# Patient Record
Sex: FEMALE | Race: WHITE
[De-identification: names, ages, dates, MRNs, and addresses within clinical notes are randomized per-mention and may not be internally consistent; named-entity substitution may affect disease eponyms.]

---

## 2017-01-06 ENCOUNTER — RX ONLY (RX ONLY)
Age: 47
End: 2017-01-06

## 2017-01-09 ENCOUNTER — DOCTOR'S OFFICE (OUTPATIENT)
Dept: URBAN - METROPOLITAN AREA CLINIC 136 | Facility: CLINIC | Age: 47
Setting detail: OPHTHALMOLOGY
End: 2017-01-09

## 2017-01-09 DIAGNOSIS — H53.002: ICD-10-CM

## 2017-01-09 DIAGNOSIS — H52.223: ICD-10-CM

## 2017-01-09 DIAGNOSIS — H52.4: ICD-10-CM

## 2017-01-09 DIAGNOSIS — H52.03: ICD-10-CM

## 2017-01-09 PROCEDURE — 92015 DETERMINE REFRACTIVE STATE: CPT | Performed by: OPTOMETRIST

## 2017-01-09 ASSESSMENT — REFRACTION_MANIFEST
OD_ADD: +1.75
OS_ADD: +1.75
OS_AXIS: 180
OS_VA1: 20/25+1
OD_CYLINDER: -1.75
OU_VA: 20/
OD_SPHERE: +1.50
OS_VA3: 20/
OD_VA1: 20/20
OD_VA3: 20/
OS_SPHERE: +5.00
OD_VA2: 20/
OD_VA1: 20/20
OD_VA3: 20/
OS_CYLINDER: -2.50
OS_SPHERE: +3.00
OS_VA2: 20/
OS_CYLINDER: +2.50
OS_VA3: 20/
OD_CYLINDER: +2.25
OS_VA1: 20/25+
OD_AXIS: 90
OS_VA2: 20/25
OU_VA: 20/20
OD_SPHERE: -0.75
OS_AXIS: 90
OD_AXIS: 010
OD_VA2: 20/20

## 2017-01-09 ASSESSMENT — REFRACTION_CURRENTRX
OD_OVR_VA: 20/
OD_OVR_VA: 20/
OS_OVR_VA: 20/
OD_OVR_VA: 20/

## 2017-01-09 ASSESSMENT — AXIALLENGTH_DERIVED
OS_AL: 21.5066
OD_AL: 22.8716
OD_AL: 22.7804
OD_AL: 22.8716
OS_AL: 21.346
OS_AL: 21.4662

## 2017-01-09 ASSESSMENT — SPHEQUIV_DERIVED
OS_SPHEQUIV: 4.25
OS_SPHEQUIV: 3.875
OD_SPHEQUIV: 0.375
OD_SPHEQUIV: 0.625
OD_SPHEQUIV: 0.375
OS_SPHEQUIV: 3.75

## 2017-01-09 ASSESSMENT — REFRACTION_OUTSIDERX
OD_SPHERE: +1.25
OS_VA3: 20/
OD_VA1: 20/
OS_VA1: 20/
OS_AXIS: 180
OD_AXIS: 010
OD_VA2: 20/
OS_SPHERE: +3.50
OS_ADD: +2.00
OS_VA2: 20/
OS_CYLINDER: -1.75
OD_CYLINDER: -1.75
OU_VA: 20/
OD_VA3: 20/
OD_ADD: +2.00

## 2017-01-09 ASSESSMENT — VISUAL ACUITY
OS_BCVA: 20/40-1
OD_BCVA: 20/200

## 2017-01-09 ASSESSMENT — KERATOMETRY
OD_K1POWER_DIOPTERS: 44.25
OS_K1POWER_DIOPTERS: 44.25
OD_K2POWER_DIOPTERS: 46.00
OD_AXISANGLE_DEGREES: 005
OS_AXISANGLE_DEGREES: 008
OS_K2POWER_DIOPTERS: 47.25

## 2017-01-09 ASSESSMENT — REFRACTION_AUTOREFRACTION
OS_SPHERE: +5.25
OS_AXIS: 003
OD_CYLINDER: -2.25
OS_CYLINDER: -2.75
OD_SPHERE: +1.50
OD_AXIS: 006

## 2017-01-09 ASSESSMENT — CONFRONTATIONAL VISUAL FIELD TEST (CVF)
OD_FINDINGS: FULL
OS_FINDINGS: FULL

## 2017-01-13 PROBLEM — H53.002: Status: ACTIVE | Noted: 2017-01-06

## 2017-01-13 PROBLEM — H52.223 ASTIGMATISM, REGULAR; BOTH EYES ;
MILD: Status: ACTIVE | Noted: 2017-01-06

## 2017-01-24 ENCOUNTER — OFFICE VISIT (OUTPATIENT)
Dept: URGENT CARE | Facility: MEDICAL CENTER | Age: 47
End: 2017-01-24
Payer: COMMERCIAL

## 2017-01-24 PROCEDURE — 99203 OFFICE O/P NEW LOW 30 MIN: CPT

## 2018-02-02 RX ORDER — DICLOFENAC SODIUM 75 MG/1
TABLET, DELAYED RELEASE ORAL
Refills: 0 | COMMUNITY
Start: 2017-11-28

## 2018-02-02 RX ORDER — BACLOFEN 10 MG/1
TABLET ORAL
COMMUNITY

## 2018-02-02 RX ORDER — VALACYCLOVIR HYDROCHLORIDE 1 G/1
TABLET, FILM COATED ORAL EVERY 12 HOURS
COMMUNITY
Start: 2017-01-24

## 2018-02-02 RX ORDER — DIAZEPAM 5 MG/1
5 TABLET ORAL DAILY PRN
Refills: 0 | COMMUNITY
Start: 2017-12-11

## 2018-02-05 ENCOUNTER — OFFICE VISIT (OUTPATIENT)
Dept: NEUROLOGY | Facility: CLINIC | Age: 48
End: 2018-02-05
Payer: COMMERCIAL

## 2018-02-05 ENCOUNTER — DOCUMENTATION (OUTPATIENT)
Dept: NEUROLOGY | Facility: CLINIC | Age: 48
End: 2018-02-05

## 2018-02-05 VITALS
WEIGHT: 127.6 LBS | SYSTOLIC BLOOD PRESSURE: 106 MMHG | RESPIRATION RATE: 18 BRPM | HEIGHT: 66 IN | HEART RATE: 77 BPM | BODY MASS INDEX: 20.51 KG/M2 | DIASTOLIC BLOOD PRESSURE: 71 MMHG

## 2018-02-05 DIAGNOSIS — M47.892 OTHER OSTEOARTHRITIS OF SPINE, CERVICAL REGION: Primary | ICD-10-CM

## 2018-02-05 DIAGNOSIS — M48.00 SPINAL STENOSIS, UNSPECIFIED SPINAL REGION: ICD-10-CM

## 2018-02-05 DIAGNOSIS — M79.18 MYOFASCIAL PAIN: ICD-10-CM

## 2018-02-05 PROCEDURE — 99245 OFF/OP CONSLTJ NEW/EST HI 55: CPT | Performed by: PSYCHIATRY & NEUROLOGY

## 2018-02-05 RX ORDER — METHOCARBAMOL 500 MG/1
500 TABLET, FILM COATED ORAL 3 TIMES DAILY
Qty: 90 TABLET | Refills: 3 | Status: SHIPPED | OUTPATIENT
Start: 2018-02-05

## 2018-02-05 RX ORDER — CETIRIZINE HYDROCHLORIDE 10 MG/1
10 TABLET ORAL
COMMUNITY

## 2018-02-05 NOTE — PROGRESS NOTES
I sent the patients disc from [facility] to [address] [place] [address]'s Radiology to be uploaded onto PACS

## 2018-02-05 NOTE — PROGRESS NOTES
Patient is here today as a new patient for a consult for Dystonia and Myofascial pain    Patient ID: Yara Zuñiga is a 49 y o  female  Assessment/Plan:   Problem List Items Addressed This Visit     None      Visit Diagnoses     Other osteoarthritis of spine, cervical region    -  Primary    Myofascial pain        Relevant Medications    methocarbamol (ROBAXIN) 500 mg tablet    Other Relevant Orders    Ambulatory referral to Pain Management    Spinal stenosis, unspecified spinal region           Mrs Thaddeus Barrios has been presented for diffuse muscle spasms likely due to severe cervical spinal stenosis with myelopathy and radiculopathy  Patient described transient muscle spasms in her right upper neck and shoulder, with improvement in her unction with Botox injections provided by [facility] team  Patient described left upper neck myofascial pain, including upper chest muscles  Hr exam was consistent with a full strength and no signs of spasticity with no dystonic features noted  Patient had negative anti ROLAN 65 antibodies and no stiff person syndroem features, as per Dr Estela Lala  Patient will start Robaxin 500 mg slow titration up to tid  She will follow with pain management to adjust her regimen and address upper neck and lower back issues  She is to continue Botox injection for presumable right should er dystonic features  Patient has been on disability for advanced arthritis and myofascial pain syndrome  We agreed to repeat MRI pain, we will upload MRI C/T/L spine to PACS  We discussed that all questions with her disability she must address with her PCP  No new ocal neurologic deficit noted on her exam, no extrapyramidal signs, normal reflexes, no coordination issues  Greater than 50% of the 60 minutes evaluation was a face-to-face discussion regarding  the pathophysiology of her current symptoms and further plan, as well as counseling, educating, and coordinating the patient's care  Billy Figueroa is a 49year old female with a history of cervical spondylosis and severe cord compression, lumbar spondylosis, myofascial pain syndrome, muscle spasms, who presented to [address] [place] [address] for evaluation of diffuse muscle spasm  As per the patient, she had upper neck muscle spasm in 2013, and in 2014 she had MRI spine with severe cervical spine stenosis at C4-5 and C5-6, requiring surgical intervention  Patient had surgical intervention in [facility] by Dr Oscar Cunningham  Patient described multiple new symptoms including right upper extremity forced shoulder abduction, with sensation of pulling muscles likely do to muscle spasms  Patient had tried tizanidine and baclofen,  But she descried diffuse muslce spasms involving upper and lower griddle  Patient has described disbalance in mobility of her upper neck and lower back  Patient described pulling sensation in her right upper neck and shoulder, requiring Botox injection by DR  [address]   Patient has been following with Loli Ortiz who evaluated the patient for  stiff person syndrome with negative ROLAN 65 and EMG study  No confirming findings suggest stiff person syndrome  Patient described stiffness in all her muscle groups including facial muscles  No focal weakness has been described  Patient has been taking baclofen 15 mg tid till recently, as she has discontinued baclofen  She has never tried tizanidine  Patient had completed imaging of her brain, cervical and thoracic and lumbar spine  Patient had no pathology noted in her brain in 12/2016  No signs of demyelination noted  Patient complaints about lower back pain and cracking sound in her lower spine and TMJs           The following portions of the patient's history were reviewed and updated as appropriate: allergies, current medications, past family history, past medical history, past social history, past surgical history and problem list  Objective:    Blood pressure 106/71, pulse 77, resp  rate 18, height 5' 6" (1 676 m), weight 57 9 kg (127 lb 9 6 oz)  Physical Exam/Neurological Exam   CONSTITUTIONAL: NAD, pleasant  NECK: supple, no lymphadenopathy, no thyromegaly, no JVD  CARDIOVASCULAR: RRR, normal S1S2, no murmurs, no rubs  RESP: clear to auscultation bilaterally, no wheezes/rhonchi/rales  ABDOMEN: soft, non tender, non distended  SKIN: no rash or skin lesions  EXTREMITIES: no edema, pulses 2+bilaterally  PSYCH: appropriate mood and affect  NEUROLOGIC COMPREHENSIVE EXAM: Patient is oriented to person, place and time, NAD; appropriate affect  CN II, III, IV, V, VI, VII,VIII,IX,X,XI-XII intact with EOMI, PERRLA, OKN intact, VF grossly intact, fundi poorly visualized secondary to pupillary constriction; symmetric face noted  Motor: 5/5 UE/LE bilateral symmetric; Sensory: intact to light touch and pinprick bilaterally; normal vibration sensation feet bilaterally; Coordination within normal limits on FTN and DANYELLE testing; DTR: 2/4 through, no Babinski, no clonus  Tandem gait is intact  Romberg: negative  ROS:  12 points of review of system was reviewed with the patient and was unremarkable with exception: see HPI  Review of Systems   Constitutional: Negative  HENT: Negative  Eyes:        Blurred vision   Respiratory: Negative  Cardiovascular: Negative  Gastrointestinal: Negative  Endocrine: Negative  Genitourinary: Negative  Musculoskeletal: Positive for back pain and neck pain  Joint pain, muscle pain, pain with walking and immobility and loss of function, balance issues and difficulty walking   Skin: Negative  Allergic/Immunologic: Negative  Neurological: Positive for speech difficulty  Twitching, cramping, tingling, clumsiness   Hematological: Negative  Psychiatric/Behavioral: Positive for confusion          Waking up at night, trouble sleeping

## 2018-02-07 ENCOUNTER — DOCUMENTATION (OUTPATIENT)
Dept: NEUROLOGY | Facility: CLINIC | Age: 48
End: 2018-02-07

## 2018-02-07 NOTE — PROGRESS NOTES
Patients disc from [facility] has been returned and uploaded onto PACS   I mailed the disc back to the patient

## 2018-02-08 ENCOUNTER — HOSPITAL ENCOUNTER (OUTPATIENT)
Dept: RADIOLOGY | Facility: HOSPITAL | Age: 48
Discharge: HOME | End: 2018-02-08
Attending: RADIOLOGY

## 2018-02-08 DIAGNOSIS — Z76.89 REFERRAL OF PATIENT WITHOUT EXAMINATION OR TREATMENT: ICD-10-CM

## 2018-03-27 ENCOUNTER — TELEPHONE (OUTPATIENT)
Dept: PAIN MEDICINE | Facility: MEDICAL CENTER | Age: 48
End: 2018-03-27

## 2022-07-26 ENCOUNTER — APPOINTMENT (RX ONLY)
Dept: URBAN - METROPOLITAN AREA CLINIC 60 | Facility: CLINIC | Age: 52
Setting detail: DERMATOLOGY
End: 2022-07-26

## 2022-07-26 DIAGNOSIS — L82.1 OTHER SEBORRHEIC KERATOSIS: ICD-10-CM

## 2022-07-26 DIAGNOSIS — L82.0 INFLAMED SEBORRHEIC KERATOSIS: ICD-10-CM

## 2022-07-26 DIAGNOSIS — L57.0 ACTINIC KERATOSIS: ICD-10-CM

## 2022-07-26 DIAGNOSIS — B35.3 TINEA PEDIS: ICD-10-CM | Status: INADEQUATELY CONTROLLED

## 2022-07-26 PROCEDURE — ? PRESCRIPTION

## 2022-07-26 PROCEDURE — 17000 DESTRUCT PREMALG LESION: CPT | Mod: 59

## 2022-07-26 PROCEDURE — 17003 DESTRUCT PREMALG LES 2-14: CPT | Mod: 59

## 2022-07-26 PROCEDURE — 17110 DESTRUCTION B9 LES UP TO 14: CPT

## 2022-07-26 PROCEDURE — 99203 OFFICE O/P NEW LOW 30 MIN: CPT | Mod: 25

## 2022-07-26 PROCEDURE — ? LIQUID NITROGEN

## 2022-07-26 PROCEDURE — ? SUNSCREEN RECOMMENDATIONS

## 2022-07-26 PROCEDURE — ? ADDITIONAL NOTES

## 2022-07-26 PROCEDURE — ? COUNSELING

## 2022-07-26 RX ORDER — KETOCONAZOLE 20 MG/G
CREAM TOPICAL BID
Qty: 60 | Refills: 4 | Status: ERX | COMMUNITY
Start: 2022-07-26

## 2022-07-26 RX ADMIN — KETOCONAZOLE: 20 CREAM TOPICAL at 00:00

## 2022-07-26 ASSESSMENT — LOCATION ZONE DERM
LOCATION ZONE: FEET
LOCATION ZONE: TRUNK
LOCATION ZONE: NECK

## 2022-07-26 ASSESSMENT — LOCATION SIMPLE DESCRIPTION DERM
LOCATION SIMPLE: CHEST
LOCATION SIMPLE: LEFT FOOT
LOCATION SIMPLE: RIGHT FOOT
LOCATION SIMPLE: UPPER BACK
LOCATION SIMPLE: RIGHT UPPER BACK
LOCATION SIMPLE: POSTERIOR NECK

## 2022-07-26 ASSESSMENT — LOCATION DETAILED DESCRIPTION DERM
LOCATION DETAILED: RIGHT MID-UPPER BACK
LOCATION DETAILED: LEFT LATERAL TRAPEZIAL NECK
LOCATION DETAILED: 2ND WEBSPACE LEFT FOOT
LOCATION DETAILED: MIDDLE STERNUM
LOCATION DETAILED: LEFT MEDIAL SUPERIOR CHEST
LOCATION DETAILED: INFERIOR THORACIC SPINE
LOCATION DETAILED: RIGHT MEDIAL SUPERIOR CHEST
LOCATION DETAILED: RIGHT DORSAL FOOT

## 2022-07-26 NOTE — PROCEDURE: LIQUID NITROGEN
Consent: The patient's consent was obtained including but not limited to risks of crusting, scabbing, blistering, scarring, darker or lighter pigmentary change, recurrence, incomplete removal and infection.
Number Of Freeze-Thaw Cycles: 3 freeze-thaw cycles
Render Note In Bullet Format When Appropriate: No
Post-Care Instructions: I reviewed with the patient in detail post-care instructions. Patient is to wear sunprotection, and avoid picking at any of the treated lesions. Pt may apply Vaseline to crusted or scabbing areas.
Show Aperture Variable?: Yes
Detail Level: Detailed
Duration Of Freeze Thaw-Cycle (Seconds): 0
Spray Paint Text: The liquid nitrogen was applied to the skin utilizing a spray paint frosting technique.
Medical Necessity Information: It is in your best interest to select a reason for this procedure from the list below. All of these items fulfill various CMS LCD requirements except the new and changing color options.
Medical Necessity Clause: This procedure was medically necessary because the lesions that were treated were:

## 2023-03-11 NOTE — PROCEDURE: REASSURANCE
Hide Include Location In Plan Question?: No
Detail Level: Detailed
Pt c/o intermittent left-sided chest pain 2x days, SOB and dizziness. PMH HLD FS 99.